# Patient Record
Sex: MALE | Race: WHITE | Employment: UNEMPLOYED | URBAN - METROPOLITAN AREA
[De-identification: names, ages, dates, MRNs, and addresses within clinical notes are randomized per-mention and may not be internally consistent; named-entity substitution may affect disease eponyms.]

---

## 2019-03-05 ENCOUNTER — HOSPITAL ENCOUNTER (OUTPATIENT)
Age: 54
Setting detail: SPECIMEN
Discharge: HOME | End: 2019-03-05
Payer: MEDICAID

## 2019-03-05 LAB
ABSOLUTE EOS #: 0.26 K/UL (ref 0–0.44)
ABSOLUTE IMMATURE GRANULOCYTE: 0.05 K/UL (ref 0–0.3)
ABSOLUTE LYMPH #: 2.06 K/UL (ref 1.1–3.7)
ABSOLUTE MONO #: 0.88 K/UL (ref 0.1–1.2)
ALBUMIN SERPL-MCNC: 4.1 G/DL (ref 3.5–5.2)
ALBUMIN/GLOBULIN RATIO: 1.6 (ref 1–2.5)
ALP BLD-CCNC: 111 U/L (ref 40–129)
ALT SERPL-CCNC: 11 U/L (ref 5–41)
ANION GAP SERPL CALCULATED.3IONS-SCNC: 16 MMOL/L (ref 9–17)
AST SERPL-CCNC: 13 U/L
BASOPHILS # BLD: 1 % (ref 0–2)
BASOPHILS ABSOLUTE: 0.16 K/UL (ref 0–0.2)
BILIRUB SERPL-MCNC: 0.53 MG/DL (ref 0.3–1.2)
BUN BLDV-MCNC: 11 MG/DL (ref 6–20)
BUN/CREAT BLD: ABNORMAL (ref 9–20)
CALCIUM SERPL-MCNC: 9.4 MG/DL (ref 8.6–10.4)
CHLORIDE BLD-SCNC: 100 MMOL/L (ref 98–107)
CO2: 19 MMOL/L (ref 20–31)
CREAT SERPL-MCNC: 0.65 MG/DL (ref 0.7–1.2)
DIFFERENTIAL TYPE: ABNORMAL
EOSINOPHILS RELATIVE PERCENT: 2 % (ref 1–4)
GFR AFRICAN AMERICAN: >60 ML/MIN
GFR NON-AFRICAN AMERICAN: >60 ML/MIN
GFR SERPL CREATININE-BSD FRML MDRD: ABNORMAL ML/MIN/{1.73_M2}
GFR SERPL CREATININE-BSD FRML MDRD: ABNORMAL ML/MIN/{1.73_M2}
GLUCOSE BLD-MCNC: 237 MG/DL (ref 70–99)
HCT VFR BLD CALC: 55.8 % (ref 40.7–50.3)
HEMOGLOBIN: 19.6 G/DL (ref 13–17)
IMMATURE GRANULOCYTES: 0 %
LYMPHOCYTES # BLD: 13 % (ref 24–43)
MAGNESIUM: 1.9 MG/DL (ref 1.6–2.6)
MCH RBC QN AUTO: 30.4 PG (ref 25.2–33.5)
MCHC RBC AUTO-ENTMCNC: 35.1 G/DL (ref 28.4–34.8)
MCV RBC AUTO: 86.5 FL (ref 82.6–102.9)
MONOCYTES # BLD: 6 % (ref 3–12)
NRBC AUTOMATED: 0 PER 100 WBC
PDW BLD-RTO: 15.3 % (ref 11.8–14.4)
PLATELET # BLD: 232 K/UL (ref 138–453)
PLATELET ESTIMATE: ABNORMAL
PMV BLD AUTO: 10.3 FL (ref 8.1–13.5)
POTASSIUM SERPL-SCNC: 5.5 MMOL/L (ref 3.7–5.3)
RBC # BLD: 6.45 M/UL (ref 4.21–5.77)
RBC # BLD: ABNORMAL 10*6/UL
SEG NEUTROPHILS: 78 % (ref 36–65)
SEGMENTED NEUTROPHILS ABSOLUTE COUNT: 12.3 K/UL (ref 1.5–8.1)
SODIUM BLD-SCNC: 135 MMOL/L (ref 135–144)
TOTAL PROTEIN: 6.6 G/DL (ref 6.4–8.3)
WBC # BLD: 15.7 K/UL (ref 3.5–11.3)
WBC # BLD: ABNORMAL 10*3/UL

## 2019-03-06 LAB
CULTURE: NORMAL
Lab: NORMAL
SPECIMEN DESCRIPTION: NORMAL

## 2019-08-30 ENCOUNTER — HOSPITAL ENCOUNTER (OUTPATIENT)
Age: 54
Setting detail: SPECIMEN
Discharge: HOME | End: 2019-08-30
Payer: MEDICAID

## 2019-08-30 LAB
ABSOLUTE EOS #: 0.4 K/UL (ref 0–0.44)
ABSOLUTE IMMATURE GRANULOCYTE: 0.05 K/UL (ref 0–0.3)
ABSOLUTE LYMPH #: 2.68 K/UL (ref 1.1–3.7)
ABSOLUTE MONO #: 0.75 K/UL (ref 0.1–1.2)
BASOPHILS # BLD: 1 % (ref 0–2)
BASOPHILS ABSOLUTE: 0.11 K/UL (ref 0–0.2)
DIFFERENTIAL TYPE: ABNORMAL
EOSINOPHILS RELATIVE PERCENT: 4 % (ref 1–4)
HCT VFR BLD CALC: 43.3 % (ref 40.7–50.3)
HEMOGLOBIN: 13.7 G/DL (ref 13–17)
IMMATURE GRANULOCYTES: 1 %
INR BLD: 2.2
LYMPHOCYTES # BLD: 25 % (ref 24–43)
MCH RBC QN AUTO: 29.5 PG (ref 25.2–33.5)
MCHC RBC AUTO-ENTMCNC: 31.6 G/DL (ref 28.4–34.8)
MCV RBC AUTO: 93.1 FL (ref 82.6–102.9)
MONOCYTES # BLD: 7 % (ref 3–12)
NRBC AUTOMATED: 0 PER 100 WBC
PDW BLD-RTO: 12.9 % (ref 11.8–14.4)
PLATELET # BLD: 255 K/UL (ref 138–453)
PLATELET ESTIMATE: ABNORMAL
PMV BLD AUTO: 10.7 FL (ref 8.1–13.5)
PROTHROMBIN TIME: 22.3 SEC (ref 9–12)
RBC # BLD: 4.65 M/UL (ref 4.21–5.77)
RBC # BLD: ABNORMAL 10*6/UL
SEG NEUTROPHILS: 62 % (ref 36–65)
SEGMENTED NEUTROPHILS ABSOLUTE COUNT: 6.81 K/UL (ref 1.5–8.1)
WBC # BLD: 10.8 K/UL (ref 3.5–11.3)
WBC # BLD: ABNORMAL 10*3/UL

## 2020-05-06 ENCOUNTER — HOSPITAL ENCOUNTER (OUTPATIENT)
Age: 55
Setting detail: SPECIMEN
Discharge: HOME | End: 2020-05-06
Payer: MEDICAID

## 2020-05-06 LAB
ALBUMIN SERPL-MCNC: 4.3 G/DL (ref 3.5–5.2)
ALBUMIN/GLOBULIN RATIO: 1.8 (ref 1–2.5)
ALP BLD-CCNC: 82 U/L (ref 40–129)
ALT SERPL-CCNC: 14 U/L (ref 5–41)
ANION GAP SERPL CALCULATED.3IONS-SCNC: 17 MMOL/L (ref 9–17)
AST SERPL-CCNC: 12 U/L
BILIRUB SERPL-MCNC: 0.32 MG/DL (ref 0.3–1.2)
BUN BLDV-MCNC: 16 MG/DL (ref 6–20)
BUN/CREAT BLD: ABNORMAL (ref 9–20)
CALCIUM SERPL-MCNC: 9.2 MG/DL (ref 8.6–10.4)
CHLORIDE BLD-SCNC: 101 MMOL/L (ref 98–107)
CO2: 19 MMOL/L (ref 20–31)
CREAT SERPL-MCNC: 0.93 MG/DL (ref 0.7–1.2)
GFR AFRICAN AMERICAN: >60 ML/MIN
GFR NON-AFRICAN AMERICAN: >60 ML/MIN
GFR SERPL CREATININE-BSD FRML MDRD: ABNORMAL ML/MIN/{1.73_M2}
GFR SERPL CREATININE-BSD FRML MDRD: ABNORMAL ML/MIN/{1.73_M2}
GLUCOSE BLD-MCNC: 170 MG/DL (ref 70–99)
HCT VFR BLD CALC: 49.9 % (ref 40.7–50.3)
HEMOGLOBIN: 16.4 G/DL (ref 13–17)
MCH RBC QN AUTO: 29.3 PG (ref 25.2–33.5)
MCHC RBC AUTO-ENTMCNC: 32.9 G/DL (ref 28.4–34.8)
MCV RBC AUTO: 89.1 FL (ref 82.6–102.9)
NRBC AUTOMATED: 0 PER 100 WBC
PDW BLD-RTO: 13.2 % (ref 11.8–14.4)
PLATELET # BLD: 242 K/UL (ref 138–453)
PMV BLD AUTO: 11.6 FL (ref 8.1–13.5)
POTASSIUM SERPL-SCNC: 4.6 MMOL/L (ref 3.7–5.3)
PROSTATE SPECIFIC ANTIGEN: 0.95 UG/L
RBC # BLD: 5.6 M/UL (ref 4.21–5.77)
SODIUM BLD-SCNC: 137 MMOL/L (ref 135–144)
TOTAL PROTEIN: 6.7 G/DL (ref 6.4–8.3)
TSH SERPL DL<=0.05 MIU/L-ACNC: 2.54 MIU/L (ref 0.3–5)
WBC # BLD: 11 K/UL (ref 3.5–11.3)

## 2020-08-10 NOTE — PROGRESS NOTES
Esther Willard. [facility], [address] [place] [address] [address] [place] [id number]  [phone]  New Patient Evaluation in Office    Pt Name: Taiwo Worley  Date of Birth 1965   Today's Date: 8/11/2020  Medical Record Number: [id number]  Referring Provider: Marina Vaca,*  Primary Care Provider: ANNETTE Field - JAMEL  Chief Complaint   Patient presents with   [facility] Consult     new patient- ref KATHY villanueva- mass on neck- doppler done 7/17     ASSESSMENT       Diagnosis Orders   1. Skin lesion of neck  Covid-19 Ambulatory   2. Pre-op testing  Covid-19 Ambulatory     Past Medical History:   Diagnosis Date    Anxiety     Diabetes mellitus ([facility]     Major depression     Peripheral nerve disorder     Peripheral vascular disease, unspecified ([facility]           PLANS      1. Schedule Thomas for excision of right neck skin lesion  2. The risks complications and benefits of the procedure were discussed with the patient including, but not limited to, infection, bleeding, recurrence, injury to adjacent tissues or organs and open wounds. The patient was given an opportunity to ask questions. Once answered, the patient is agreeable to proceed with the procedure. 2. Status: outpatient  3. Planned anesthesia: MAC  4. He will undergo pre-operative clearance per anesthesia guidelines with risk factors listed under the past medical history diagnosis & problem list.  5. Perioperative discontinuation of warfarin and Effient. Continuation of 81 mg Aspirin is acceptable. Lynette Ku is a 62 y.o. male seen in the consultation for evaluation of a subcutaneous mass. The mass was first noted a few months ago. The mass is located right neck, is not tender. It has been enlarging and bleeding since it was first noticed. He denies fatigue, night sweats, weight loss, recent or current infections, immunosuppression and personal history of malignancy.   Past Medical History  Past Medical History:   Diagnosis Date    Anxiety     Diabetes mellitus ([facility]     Major depression     Peripheral nerve disorder     Peripheral vascular disease, unspecified ([facility] [facility]      Past Surgical History  Past Surgical History:   Procedure Laterality Date    CARPAL TUNNEL RELEASE      90s    CORONARY ANGIOPLASTY WITH STENT PLACEMENT  2018    Dr. Erica Garces  2005     Medications  Current Outpatient Medications   Medication Sig Dispense Refill    ASPIRIN LOW DOSE 81 MG EC tablet       atorvastatin (LIPITOR) 40 MG tablet       Emollient (EUCERIN) lotion Eucerin External Lotion Eucerin External Lotion 05/06/2020 Provider: Alex Rabago [facility] 05- [facility] 87 ([id number])      gabapentin (NEURONTIN) 600 MG tablet       JANUVIA 100 MG tablet       losartan (COZAAR) 50 MG tablet       MELATONIN MAXIMUM STRENGTH 5 MG TABS tablet       metFORMIN (GLUCOPHAGE-XR) 500 MG extended release tablet       metoprolol tartrate (LOPRESSOR) 25 MG tablet       prasugrel (EFFIENT) 10 MG TABS       STEGLATRO 5 MG TABS       warfarin (COUMADIN) 5 MG tablet take 1 tablet by mouth once daily as directed       No current facility-administered medications for this visit. Allergies  has No Known Allergies. Family History  family history includes Diabetes in his father, maternal grandmother, mother, and paternal grandmother. Social History   reports that he has quit smoking. He has never used smokeless tobacco. He reports previous alcohol use. He reports that he does not use drugs.   Health Screening Exams  Health Maintenance   Topic Date Due    Hepatitis C screen  1965    Lipid screen  09/08/1973    HIV screen  09/08/1978    DTaP/Tdap/Td vaccine (1 - Tdap) 09/08/1982    Diabetes screen  09/08/2003    Shingles Vaccine (1 of 2) 09/08/2013    Colon cancer screen colonoscopy  09/08/2013    Flu vaccine (1) 09/01/2020    Potassium monitoring  05/06/2021    Creatinine monitoring  05/06/2021    Hepatitis A vaccine  Aged Out    Hepatitis B vaccine  Aged Out    Hib vaccine  Aged Out    Meningococcal (ACWY) vaccine  Aged Out    Pneumococcal 0-64 years Vaccine  Aged Out     Review of Systems  Constitutional: Negative for activity change, appetite change, chills, diaphoresis, fatigue, fever and unexpected weight change. HENT: Negative for congestion, dental problem, drooling, ear discharge, ear pain, facial swelling, hearing loss, mouth sores, nosebleeds, postnasal drip, rhinorrhea, sinus pressure, sneezing, sore throat, tinnitus, trouble swallowing and voice change. Eyes: Negative for photophobia, pain, discharge, redness, itching and visual disturbance. Respiratory: Negative for apnea, cough, choking, chest tightness, shortness of breath, wheezing and stridor. Cardiovascular: Negative for chest pain, palpitations and leg swelling. Gastrointestinal: Negative for abdominal distention, abdominal pain, anal bleeding, blood in stool, constipation, diarrhea, nausea, rectal pain and vomiting. Genitourinary: Negative for decreased urine volume, difficulty urinating, discharge, dysuria, enuresis, flank pain, frequency, genital sores, hematuria, penile pain, penile swelling, scrotal swelling, testicular pain and urgency. Musculoskeletal: Negative for arthralgias, back pain, gait problem, joint swelling, myalgias, neck pain and neck stiffness. Skin: Negative for color change, pallor, rash and wound. Neurological: Negative for dizziness, tremors, seizures, syncope, facial asymmetry, speech difficulty, weakness, light-headedness, numbness and headaches. Hematological: Negative for adenopathy. Does not bruise/bleed easily. Psychiatric/Behavioral: Negative for agitation, behavioral problems, confusion, decreased concentration, dysphoric mood, hallucinations, self-injury, sleep disturbance and suicidal ideas.  The patient is not nervous/anxious and is not hyperactive. OBJECTIVE    VITALS:  height is 5' 9\" (1.753 m) and weight is 218 lb (98.9 kg). His temporal temperature is 98.2 °F (36.8 °C). His blood pressure is 132/84 and his pulse is 72. His respiration is 18 and oxygen saturation is 97%. Body mass index is 32.19 kg/m². CONSTITUTIONAL: Alert and oriented times 3, no acute distress and cooperative to examination with proper mood and affect. SKIN: Skin color, texture, turgor normal. 1.2 cm dermal lesion with superficial excoriation present on the right neck, possible basal cell. HEENT: Head is normocephalic, atraumatic. EOMI, PERRLA. NECK: Supple, symmetrical, trachea midline, thyroid symmetric, not enlarged and no tenderness, skin normal.  CHEST/LUNGS: chest symmetric with normal A/P diameter, normal respiratory rate and rhythm, lungs clear to auscultation without wheezes, rales or rhonchi. No accessory muscle use. Scars None   CARDIOVASCULAR: Heart sounds are normal.  Regular rate and rhythm without murmur, gallop or rub. Normal S1 and S2. Carotid and femoral pulses 2+/4 and equal bilaterally. ABDOMEN: obese No scar(s) present. Normal bowel sounds. No bruits. soft, nontender, nondistended, no masses or organomegaly. no evidence of hernia. Percussion: Normal without hepatosplenomegally. RECTAL: deferred, not clinically indicated  NEUROLOGIC: There are no focalizing motor or sensory deficits. CN II-XII are grossly intact. Othelia Ricardo EXTREMITIES: no cyanosis, no clubbing and no edema. LYMPH: No cervical or inguinal lymphadenopathy. Thank you for the interesting evaluation. Further recommendations as listed above.        Electronically signed by Balwinder Hercules DO on 8/11/2020 at 12:09 PM

## 2020-08-11 ENCOUNTER — OFFICE VISIT (OUTPATIENT)
Dept: SURGERY | Age: 55
End: 2020-08-11
Payer: MEDICAID

## 2020-08-11 ENCOUNTER — TELEPHONE (OUTPATIENT)
Dept: SURGERY | Age: 55
End: 2020-08-11

## 2020-08-11 VITALS
HEIGHT: 69 IN | BODY MASS INDEX: 32.29 KG/M2 | DIASTOLIC BLOOD PRESSURE: 84 MMHG | TEMPERATURE: 98.2 F | WEIGHT: 218 LBS | SYSTOLIC BLOOD PRESSURE: 132 MMHG | RESPIRATION RATE: 18 BRPM | OXYGEN SATURATION: 97 % | HEART RATE: 72 BPM

## 2020-08-11 PROCEDURE — G8427 DOCREV CUR MEDS BY ELIG CLIN: HCPCS | Performed by: SURGERY

## 2020-08-11 PROCEDURE — 99203 OFFICE O/P NEW LOW 30 MIN: CPT | Performed by: SURGERY

## 2020-08-11 PROCEDURE — G8417 CALC BMI ABV UP PARAM F/U: HCPCS | Performed by: SURGERY

## 2020-08-11 RX ORDER — ATORVASTATIN CALCIUM 40 MG/1
TABLET, FILM COATED ORAL
COMMUNITY
Start: 2020-07-29

## 2020-08-11 RX ORDER — WARFARIN SODIUM 5 MG/1
5 TABLET ORAL DAILY
COMMUNITY
Start: 2020-07-22

## 2020-08-11 RX ORDER — LOSARTAN POTASSIUM 50 MG/1
TABLET ORAL
COMMUNITY
Start: 2020-05-20

## 2020-08-11 RX ORDER — PRASUGREL 10 MG/1
TABLET, FILM COATED ORAL
Status: ON HOLD | COMMUNITY
Start: 2020-05-05 | End: 2020-08-20 | Stop reason: ALTCHOICE

## 2020-08-11 RX ORDER — ERTUGLIFLOZIN 5 MG/1
TABLET, FILM COATED ORAL
COMMUNITY
Start: 2020-07-21

## 2020-08-11 RX ORDER — LANOLIN ALCOHOL/MO/W.PET/CERES
CREAM (GRAM) TOPICAL
COMMUNITY
Start: 2020-05-06

## 2020-08-11 RX ORDER — GABAPENTIN 600 MG/1
TABLET ORAL
COMMUNITY
Start: 2020-07-24

## 2020-08-11 RX ORDER — METFORMIN HYDROCHLORIDE 500 MG/1
TABLET, EXTENDED RELEASE ORAL
COMMUNITY
Start: 2020-07-28

## 2020-08-11 RX ORDER — ASPIRIN 81 MG/1
TABLET, COATED ORAL
COMMUNITY
Start: 2020-07-21 | End: 2022-05-17

## 2020-08-11 RX ORDER — MELATONIN 5 MG
TABLET ORAL
Status: ON HOLD | COMMUNITY
Start: 2020-07-28 | End: 2020-08-20 | Stop reason: ALTCHOICE

## 2020-08-11 RX ORDER — SITAGLIPTIN 100 MG/1
TABLET, FILM COATED ORAL
COMMUNITY
Start: 2020-07-01

## 2020-08-11 ASSESSMENT — ENCOUNTER SYMPTOMS
APNEA: 0
VOMITING: 0
COLOR CHANGE: 0
SINUS PRESSURE: 0
SHORTNESS OF BREATH: 0
ANAL BLEEDING: 0
CONSTIPATION: 0
COUGH: 0
BLOOD IN STOOL: 0
EYE DISCHARGE: 0
CHEST TIGHTNESS: 0
STRIDOR: 0
FACIAL SWELLING: 0
CHOKING: 0
ABDOMINAL PAIN: 0
RHINORRHEA: 0
NAUSEA: 0
EYE ITCHING: 0
EYE PAIN: 0
WHEEZING: 0
SORE THROAT: 0
PHOTOPHOBIA: 0
TROUBLE SWALLOWING: 0
RECTAL PAIN: 0
VOICE CHANGE: 0
DIARRHEA: 0
EYE REDNESS: 0
BACK PAIN: 0
ABDOMINAL DISTENTION: 0

## 2020-08-11 NOTE — TELEPHONE ENCOUNTER
Spoke w/ pt, informed him he would need COVID testing 5-7 days prior to his surgery on 8/20.   Pt voiced understanding

## 2020-08-11 NOTE — PROGRESS NOTES
Subjective:      Patient ID: Asif Shipman is a 62 y.o. male. Chief Complaint   Patient presents with    Surgical Consult     new patient- ref KATHY villanueva- mass on neck- doppler done 7/17       HPI    Review of Systems   Constitutional: Negative for activity change, appetite change, chills, diaphoresis, fatigue, fever and unexpected weight change. HENT: Negative for congestion, dental problem, drooling, ear discharge, ear pain, facial swelling, hearing loss, mouth sores, nosebleeds, postnasal drip, rhinorrhea, sinus pressure, sneezing, sore throat, tinnitus, trouble swallowing and voice change. Eyes: Negative for photophobia, pain, discharge, redness, itching and visual disturbance. Respiratory: Negative for apnea, cough, choking, chest tightness, shortness of breath, wheezing and stridor. Cardiovascular: Negative for chest pain, palpitations and leg swelling. Gastrointestinal: Negative for abdominal distention, abdominal pain, anal bleeding, blood in stool, constipation, diarrhea, nausea, rectal pain and vomiting. Genitourinary: Negative for decreased urine volume, difficulty urinating, discharge, dysuria, enuresis, flank pain, frequency, genital sores, hematuria, penile pain, penile swelling, scrotal swelling, testicular pain and urgency. Musculoskeletal: Negative for arthralgias, back pain, gait problem, joint swelling, myalgias, neck pain and neck stiffness. Skin: Negative for color change, pallor, rash and wound. Neurological: Negative for dizziness, tremors, seizures, syncope, facial asymmetry, speech difficulty, weakness, light-headedness, numbness and headaches. Hematological: Negative for adenopathy. Does not bruise/bleed easily. Psychiatric/Behavioral: Negative for agitation, behavioral problems, confusion, decreased concentration, dysphoric mood, hallucinations, self-injury, sleep disturbance and suicidal ideas. The patient is not nervous/anxious and is not hyperactive. /84 (Site: Right Upper Arm, Position: Sitting, Cuff Size: Medium Adult)   Pulse 72   Temp 98.2 °F (36.8 °C) (Temporal)   Resp 18   Ht 5' 9\" (1.753 m)   Wt 218 lb (98.9 kg)   SpO2 97%   BMI 32.19 kg/m²     Objective:   Physical Exam    Assessment:          Plan:              [employer], LPN

## 2020-08-14 ENCOUNTER — HOSPITAL ENCOUNTER (OUTPATIENT)
Age: 55
Discharge: HOME | End: 2020-08-14
Payer: MEDICAID

## 2020-08-14 PROCEDURE — U0003 INFECTIOUS AGENT DETECTION BY NUCLEIC ACID (DNA OR RNA); SEVERE ACUTE RESPIRATORY SYNDROME CORONAVIRUS 2 (SARS-COV-2) (CORONAVIRUS DISEASE [COVID-19]), AMPLIFIED PROBE TECHNIQUE, MAKING USE OF HIGH THROUGHPUT TECHNOLOGIES AS DESCRIBED BY CMS-2020-01-R: HCPCS

## 2020-08-16 LAB — SARS-COV-2: NOT DETECTED

## 2020-08-19 NOTE — PROGRESS NOTES
Message left for the patient to call \[facility]\"" regarding COVID questions.

## 2020-08-20 ENCOUNTER — HOSPITAL ENCOUNTER (OUTPATIENT)
Age: 55
Setting detail: OUTPATIENT SURGERY
Discharge: HOME | End: 2020-08-20
Attending: SURGERY | Admitting: SURGERY
Payer: MEDICAID

## 2020-08-20 ENCOUNTER — ANESTHESIA EVENT (OUTPATIENT)
Dept: OPERATING ROOM | Age: 55
End: 2020-08-20
Payer: MEDICAID

## 2020-08-20 ENCOUNTER — ANESTHESIA (OUTPATIENT)
Dept: OPERATING ROOM | Age: 55
End: 2020-08-20
Payer: MEDICAID

## 2020-08-20 VITALS
SYSTOLIC BLOOD PRESSURE: 163 MMHG | HEART RATE: 49 BPM | OXYGEN SATURATION: 100 % | BODY MASS INDEX: 32.08 KG/M2 | TEMPERATURE: 97.4 F | WEIGHT: 216.6 LBS | HEIGHT: 69 IN | DIASTOLIC BLOOD PRESSURE: 67 MMHG | RESPIRATION RATE: 16 BRPM

## 2020-08-20 VITALS — OXYGEN SATURATION: 99 % | DIASTOLIC BLOOD PRESSURE: 44 MMHG | SYSTOLIC BLOOD PRESSURE: 96 MMHG

## 2020-08-20 LAB — GLUCOSE BLD-MCNC: 186 MG/DL (ref 70–108)

## 2020-08-20 PROCEDURE — 2580000003 HC RX 258: Performed by: SURGERY

## 2020-08-20 PROCEDURE — 6360000002 HC RX W HCPCS: Performed by: NURSE ANESTHETIST, CERTIFIED REGISTERED

## 2020-08-20 PROCEDURE — 88305 TISSUE EXAM BY PATHOLOGIST: CPT

## 2020-08-20 PROCEDURE — 3700000001 HC ADD 15 MINUTES (ANESTHESIA): Performed by: SURGERY

## 2020-08-20 PROCEDURE — 7100000010 HC PHASE II RECOVERY - FIRST 15 MIN: Performed by: SURGERY

## 2020-08-20 PROCEDURE — 2709999900 HC NON-CHARGEABLE SUPPLY: Performed by: SURGERY

## 2020-08-20 PROCEDURE — 11622 EXC S/N/H/F/G MAL+MRG 1.1-2: CPT | Performed by: SURGERY

## 2020-08-20 PROCEDURE — 6360000002 HC RX W HCPCS: Performed by: SURGERY

## 2020-08-20 PROCEDURE — 3700000000 HC ANESTHESIA ATTENDED CARE: Performed by: SURGERY

## 2020-08-20 PROCEDURE — 2500000003 HC RX 250 WO HCPCS: Performed by: SURGERY

## 2020-08-20 PROCEDURE — 7100000011 HC PHASE II RECOVERY - ADDTL 15 MIN: Performed by: SURGERY

## 2020-08-20 PROCEDURE — 3600000002 HC SURGERY LEVEL 2 BASE: Performed by: SURGERY

## 2020-08-20 PROCEDURE — 82948 REAGENT STRIP/BLOOD GLUCOSE: CPT

## 2020-08-20 PROCEDURE — 3600000012 HC SURGERY LEVEL 2 ADDTL 15MIN: Performed by: SURGERY

## 2020-08-20 RX ORDER — LIDOCAINE HYDROCHLORIDE 10 MG/ML
INJECTION, SOLUTION EPIDURAL; INFILTRATION; INTRACAUDAL; PERINEURAL PRN
Status: DISCONTINUED | OUTPATIENT
Start: 2020-08-20 | End: 2020-08-20 | Stop reason: ALTCHOICE

## 2020-08-20 RX ORDER — HYDROCODONE BITARTRATE AND ACETAMINOPHEN 5; 325 MG/1; MG/1
1 TABLET ORAL EVERY 4 HOURS PRN
Status: DISCONTINUED | OUTPATIENT
Start: 2020-08-20 | End: 2020-08-20 | Stop reason: HOSPADM

## 2020-08-20 RX ORDER — SODIUM CHLORIDE 9 MG/ML
INJECTION, SOLUTION INTRAVENOUS CONTINUOUS
Status: DISCONTINUED | OUTPATIENT
Start: 2020-08-20 | End: 2020-08-20 | Stop reason: HOSPADM

## 2020-08-20 RX ORDER — ONDANSETRON 2 MG/ML
4 INJECTION INTRAMUSCULAR; INTRAVENOUS EVERY 6 HOURS PRN
Status: DISCONTINUED | OUTPATIENT
Start: 2020-08-20 | End: 2020-08-20 | Stop reason: HOSPADM

## 2020-08-20 RX ORDER — SODIUM CHLORIDE 0.9 % (FLUSH) 0.9 %
10 SYRINGE (ML) INJECTION PRN
Status: DISCONTINUED | OUTPATIENT
Start: 2020-08-20 | End: 2020-08-20 | Stop reason: HOSPADM

## 2020-08-20 RX ORDER — MIDAZOLAM HYDROCHLORIDE 1 MG/ML
INJECTION INTRAMUSCULAR; INTRAVENOUS PRN
Status: DISCONTINUED | OUTPATIENT
Start: 2020-08-20 | End: 2020-08-20 | Stop reason: SDUPTHER

## 2020-08-20 RX ORDER — SODIUM CHLORIDE 0.9 % (FLUSH) 0.9 %
10 SYRINGE (ML) INJECTION EVERY 12 HOURS SCHEDULED
Status: DISCONTINUED | OUTPATIENT
Start: 2020-08-20 | End: 2020-08-20 | Stop reason: HOSPADM

## 2020-08-20 RX ORDER — PROPOFOL 10 MG/ML
INJECTION, EMULSION INTRAVENOUS CONTINUOUS PRN
Status: DISCONTINUED | OUTPATIENT
Start: 2020-08-20 | End: 2020-08-20 | Stop reason: SDUPTHER

## 2020-08-20 RX ORDER — HYDROCODONE BITARTRATE AND ACETAMINOPHEN 5; 325 MG/1; MG/1
1 TABLET ORAL EVERY 6 HOURS PRN
Qty: 20 TABLET | Refills: 0 | Status: SHIPPED | OUTPATIENT
Start: 2020-08-20 | End: 2020-08-25

## 2020-08-20 RX ORDER — FENTANYL CITRATE 50 UG/ML
INJECTION, SOLUTION INTRAMUSCULAR; INTRAVENOUS PRN
Status: DISCONTINUED | OUTPATIENT
Start: 2020-08-20 | End: 2020-08-20 | Stop reason: SDUPTHER

## 2020-08-20 RX ADMIN — SODIUM CHLORIDE: 9 INJECTION, SOLUTION INTRAVENOUS at 09:05

## 2020-08-20 RX ADMIN — PROPOFOL 100 MCG/KG/MIN: 10 INJECTION, EMULSION INTRAVENOUS at 10:22

## 2020-08-20 RX ADMIN — FENTANYL CITRATE 50 MCG: 50 INJECTION, SOLUTION INTRAMUSCULAR; INTRAVENOUS at 10:24

## 2020-08-20 RX ADMIN — CEFAZOLIN 2 G: 10 INJECTION, POWDER, FOR SOLUTION INTRAVENOUS at 10:29

## 2020-08-20 RX ADMIN — MIDAZOLAM HYDROCHLORIDE 2 MG: 1 INJECTION, SOLUTION INTRAMUSCULAR; INTRAVENOUS at 10:24

## 2020-08-20 ASSESSMENT — PULMONARY FUNCTION TESTS
PIF_VALUE: 0
PIF_VALUE: 1
PIF_VALUE: 0
PIF_VALUE: 1
PIF_VALUE: 0
PIF_VALUE: 0
PIF_VALUE: 1
PIF_VALUE: 0
PIF_VALUE: 1
PIF_VALUE: 0
PIF_VALUE: 1
PIF_VALUE: 1
PIF_VALUE: 0
PIF_VALUE: 1

## 2020-08-20 ASSESSMENT — PAIN SCALES - GENERAL
PAINLEVEL_OUTOF10: 0

## 2020-08-20 ASSESSMENT — PAIN DESCRIPTION - PAIN TYPE: TYPE: SURGICAL PAIN

## 2020-08-20 ASSESSMENT — PAIN DESCRIPTION - LOCATION: LOCATION: NECK

## 2020-08-20 NOTE — BRIEF OP NOTE
Brief Postoperative Note      Patient: Dione Yanez  YOB: 1965  MRN: [id number]    Date of Procedure: 8/20/2020    Pre-Op Diagnosis: RIGHT NECK SKIN LESION    Post-Op Diagnosis: Same       Procedure(s):  EXCISION RIGHT NECK SKIN LESION 2x 1 cm down to fat    Surgeon(s):  Marily Cuello DO    Assistant:  First Assistant: Cherylene Netter, RN    Anesthesia: Monitor Anesthesia Care    Estimated Blood Loss (mL): 5    Complications: None    Specimens:   ID Type Source Tests Collected by Time Destination   A : RIGHT NECK SKIN LESION Tissue Neck SURGICAL [address] 41DO 8/20/2020 2521      Specimen tagged anterior with long white suture and lateral with short white suture.     Electronically signed by Marily Cuello DO on 8/20/2020 at 10:49 AM

## 2020-08-20 NOTE — ANESTHESIA PRE PROCEDURE
Allergies:  No Known Allergies    Problem List:  There is no problem list on file for this patient. Past Medical History:        Diagnosis Date    Anxiety     Diabetes mellitus ([facility]     Major depression     Peripheral nerve disorder     [facility]        Past Surgical History:        Procedure Laterality Date    CARPAL TUNNEL RELEASE      90s    CORONARY ANGIOPLASTY WITH STENT PLACEMENT  2018    Dr. Mary Cassidy  2005       Social History:    Social History     Tobacco Use    Smoking status: Former Smoker    Smokeless tobacco: Never Used   Substance Use Topics    Alcohol use: Not Currently                                Counseling given: Not Answered      Vital Signs (Current):   Vitals:    08/20/20 0824   BP: (!) 151/68   Pulse: 58   Resp: 16   Temp: 96.8 °F (36 °C)   TempSrc: Temporal   SpO2: 98%   Weight: 216 lb 9.6 oz (98.2 kg)   Height: 5' 9\" (1.753 m)                                              BP Readings from Last 3 Encounters:   08/20/20 (!) 151/68   08/11/20 132/84       NPO Status:                                                                                 BMI:   Wt Readings from Last 3 Encounters:   08/20/20 216 lb 9.6 oz (98.2 kg)   08/11/20 218 lb (98.9 kg)     Body mass index is 31.99 kg/m².     CBC:   Lab Results   Component Value Date    WBC 11.0 05/06/2020    RBC 5.60 05/06/2020    HGB 16.4 05/06/2020    HCT 49.9 05/06/2020    MCV 89.1 05/06/2020    RDW 13.2 05/06/2020     05/06/2020       CMP:   Lab Results   Component Value Date     05/06/2020    K 4.6 05/06/2020     05/06/2020    CO2 19 05/06/2020    BUN 16 05/06/2020    CREATININE 0.93 05/06/2020    GFRAA >60 05/06/2020    LABGLOM >60 05/06/2020    GLUCOSE 170 05/06/2020    PROT 6.7 05/06/2020    CALCIUM 9.2 05/06/2020    BILITOT 0.32 05/06/2020    ALKPHOS 82 05/06/2020    AST 12 05/06/2020    ALT 14 05/06/2020       POC Tests: No results for input(s): POCGLU, POCNA, POCK, POCCL, POCBUN, POCHEMO, POCHCT in the last 72 hours. Coags:   Lab Results   Component Value Date    PROTIME 22.3 08/30/2019    INR 2.2 08/30/2019       HCG (If Applicable): No results found for: PREGTESTUR, PREGSERUM, HCG, HCGQUANT     ABGs: No results found for: PHART, PO2ART, EDI9YAG, IAE1IDZ, BEART, X7VNELAM     Type & Screen (If Applicable):  No results found for: LABABO, LABRH    Drug/Infectious Status (If Applicable):  No results found for: HIV, HEPCAB    COVID-19 Screening (If Applicable):   Lab Results   Component Value Date    COVID19 Not Detected 08/14/2020         Anesthesia Evaluation    Airway: Mallampati: II  TM distance: >3 FB   Neck ROM: full  Mouth opening: > = 3 FB Dental:          Pulmonary:   (+) decreased breath sounds,                             Cardiovascular:            Rhythm: regular                      Neuro/Psych:   (+) psychiatric history:            GI/Hepatic/Renal:             Endo/Other:    (+) Diabetes, . Abdominal:   (+) obese,         Vascular:                                        Anesthesia Plan      MAC     ASA 3     (Pt.'s sister along with pt.)  Induction: intravenous. MIPS: Postoperative opioids intended and Prophylactic antiemetics administered. Anesthetic plan and risks discussed with patient and sibling. Plan discussed with CRNA.                   Kylie Geronimo MD   8/20/2020

## 2020-08-20 NOTE — H&P
Vane Mcconnell. [facility], [address] [place] [id number]  [phone]  New Patient Evaluation in Office    Pt Name: Asif Shipman  Date of Birth 1965   Today's Date: 8/11/2020  Medical Record Number: [id number]  Referring Provider: Júnior Gunderson,*  Primary Care Provider: Igor Oliva, APRN - CNP  Chief Complaint   Patient presents with   [facility] Surgical Consult     new patient- ref KATHY villanueva- mass on neck- doppler done 7/17     ASSESSMENT       Diagnosis Orders   1. Skin lesion of neck  Covid-19 Ambulatory   2. Pre-op testing  Covid-19 Ambulatory     Past Medical History:   Diagnosis Date    Anxiety     Diabetes mellitus ([facility]     Major depression     Peripheral nerve disorder     Peripheral vascular disease, unspecified ([facility]           PLANS      1. Schedule Thomas for excision of right neck skin lesion  2. The risks complications and benefits of the procedure were discussed with the patient including, but not limited to, infection, bleeding, recurrence, injury to adjacent tissues or organs and open wounds. The patient was given an opportunity to ask questions. Once answered, the patient is agreeable to proceed with the procedure. 2. Status: outpatient  3. Planned anesthesia: MAC  4. He will undergo pre-operative clearance per anesthesia guidelines with risk factors listed under the past medical history diagnosis & problem list.  5. Perioperative discontinuation of warfarin and Effient. Continuation of 81 mg Aspirin is acceptable. Bhavana Amaral is a 62 y.o. male seen in the consultation for evaluation of a subcutaneous mass. The mass was first noted a few months ago. The mass is located right neck, is not tender. It has been enlarging and bleeding since it was first noticed. He denies fatigue, night sweats, weight loss, recent or current infections, immunosuppression and personal history of malignancy.   Past Medical Creatinine monitoring  05/06/2021    Hepatitis A vaccine  Aged Out    Hepatitis B vaccine  Aged Out    Hib vaccine  Aged Out    Meningococcal (ACWY) vaccine  Aged Out    Pneumococcal 0-64 years Vaccine  Aged Out     Review of Systems  Constitutional: Negative for activity change, appetite change, chills, diaphoresis, fatigue, fever and unexpected weight change. HENT: Negative for congestion, dental problem, drooling, ear discharge, ear pain, facial swelling, hearing loss, mouth sores, nosebleeds, postnasal drip, rhinorrhea, sinus pressure, sneezing, sore throat, tinnitus, trouble swallowing and voice change. Eyes: Negative for photophobia, pain, discharge, redness, itching and visual disturbance. Respiratory: Negative for apnea, cough, choking, chest tightness, shortness of breath, wheezing and stridor. Cardiovascular: Negative for chest pain, palpitations and leg swelling. Gastrointestinal: Negative for abdominal distention, abdominal pain, anal bleeding, blood in stool, constipation, diarrhea, nausea, rectal pain and vomiting. Genitourinary: Negative for decreased urine volume, difficulty urinating, discharge, dysuria, enuresis, flank pain, frequency, genital sores, hematuria, penile pain, penile swelling, scrotal swelling, testicular pain and urgency. Musculoskeletal: Negative for arthralgias, back pain, gait problem, joint swelling, myalgias, neck pain and neck stiffness. Skin: Negative for color change, pallor, rash and wound. Neurological: Negative for dizziness, tremors, seizures, syncope, facial asymmetry, speech difficulty, weakness, light-headedness, numbness and headaches. Hematological: Negative for adenopathy. Does not bruise/bleed easily. Psychiatric/Behavioral: Negative for agitation, behavioral problems, confusion, decreased concentration, dysphoric mood, hallucinations, self-injury, sleep disturbance and suicidal ideas.  The patient is not nervous/anxious and is not hyperactive. OBJECTIVE    VITALS:  height is 5' 9\" (1.753 m) and weight is 218 lb (98.9 kg). His temporal temperature is 98.2 °F (36.8 °C). His blood pressure is 132/84 and his pulse is 72. His respiration is 18 and oxygen saturation is 97%. Body mass index is 32.19 kg/m². CONSTITUTIONAL: Alert and oriented times 3, no acute distress and cooperative to examination with proper mood and affect. SKIN: Skin color, texture, turgor normal. 1.2 cm dermal lesion with superficial excoriation present on the right neck, possible basal cell. HEENT: Head is normocephalic, atraumatic. EOMI, PERRLA. NECK: Supple, symmetrical, trachea midline, thyroid symmetric, not enlarged and no tenderness, skin normal.  CHEST/LUNGS: chest symmetric with normal A/P diameter, normal respiratory rate and rhythm, lungs clear to auscultation without wheezes, rales or rhonchi. No accessory muscle use. Scars None   CARDIOVASCULAR: Heart sounds are normal.  Regular rate and rhythm without murmur, gallop or rub. Normal S1 and S2. Carotid and femoral pulses 2+/4 and equal bilaterally. ABDOMEN: obese No scar(s) present. Normal bowel sounds. No bruits. soft, nontender, nondistended, no masses or organomegaly. no evidence of hernia. Percussion: Normal without hepatosplenomegally. RECTAL: deferred, not clinically indicated  NEUROLOGIC: There are no focalizing motor or sensory deficits. CN II-XII are grossly intact. Juan Ward EXTREMITIES: no cyanosis, no clubbing and no edema. LYMPH: No cervical or inguinal lymphadenopathy. Thank you for the interesting evaluation. Further recommendations as listed above.        Electronically signed by Cory Gonzalez DO on 8/11/2020 at 12:09 PM      DO RO [address] DR GENERAL SURGERY  History and Physical Update    Pt Name: Rusty Cyr  MRN: [id number]  YOB: 1965  Date of evaluation: 8/20/2020    I have examined the patient and reviewed the H&P/Consult and there are no changes to the patient or plans.          Electronically signed by Lindsey Conroy DO on 8/20/2020 at 6:53 AM

## 2020-08-20 NOTE — PROGRESS NOTES
Alert. Family at bedside. Apple sauce and pepsi given. Side rails up bed in low position.  Call light in reach

## 2020-08-21 ENCOUNTER — TELEPHONE (OUTPATIENT)
Dept: SURGERY | Age: 55
End: 2020-08-21

## 2020-08-21 NOTE — TELEPHONE ENCOUNTER
8/20/2020 excision of right neck skin lesion    Pt states he is resting this morning, he is feeling okay. Going to the bathroom okay, appetite okay, minimal pain controlled with pain medication. Pt advised to use stool softeners and/or laxatives as needed and to increase water intake if able. Pt states home health nurse will be making a house call later this afternoon and will help set his medications up and check the incision. Advised to call the office with any questions or concerns.

## 2020-08-22 NOTE — OP NOTE
[address] [place] [address]                                OPERATIVE REPORT    PATIENT NAME: Robel Parada                   :        1965  MED REC NO:   [id number]                           ROOM:  ACCOUNT NO:   [de-identified]                           ADMIT DATE: 2020  PROVIDER:     Maritza Kimball D.O.    DATE OF PROCEDURE:  2020    PREOPERATIVE DIAGNOSIS:  Right-sided neck skin lesion. POSTOPERATIVE DIAGNOSIS:  Right-sided neck skin lesion. PROCEDURE PERFORMED:  Excision of right-sided neck lesion measuring 2 cm  in length and 1 cm in width down to the fatty tissue. SURGEON:  Maritza Kimball DO    ANESTHESIA:  Local with sedation. ESTIMATED BLOOD LOSS:  5 mL. SPECIMEN:  Skin lesion sent to Pathology for analysis, marked with a  long suture anteriorly and a short suture laterally. COMPLICATIONS:  None immediately appreciated. DISCUSSION:  The patient is a 39-year-old male who presented to my  office and seen in evaluation regarding lesion located in his right  posterior neck. After history and physical examination was performed,  potential diagnostic and therapeutic modalities were discussed with the  patient. Operative and nonoperative management were discussed. Risks,  complications, and benefit were reviewed. He was given the opportunity  to ask questions, once answered and informed consent was obtained, the  patient was brought to the operating room on 2020 for procedure. DESCRIPTION OF PROCEDURE:  The patient was brought to the operating  room, placed in the left lateral decubitus position; placed under  continuous cardiac telemetry, blood pressure and pulse oximetry  monitoring; and placed under IV sedation by the anesthesia department. The area was then prepped and draped in sterile fashion and infiltrated  with local anesthetic.   The lesion was elliptically incised using a

## 2020-09-03 NOTE — PROGRESS NOTES
Coreen Manning. [facility], [address] [place] [address]. [address]  [place] [address]  [phone]  Post Procedure Evaluation in Office    Patient seen and examined independently by me. The above note has been modified by me to reflect current findings and plans. Electronically signed by Brandon Jacques DO on 9/4/2020 at 10:31 AM      Pt Name: Kathrine Dalton  Date of Birth 1965   Today's Date: 9/4/2020  Medical Record Number: [id number]  Referring Provider: No ref. provider found  Primary Care Provider: Lynn Cervantes, APRN - CNP  Chief Complaint   Patient presents with   [facility] Post-Op Check     s/p Excision of right-sided neck lesion measuring 2 cm in length and 1 cm in width down to the fatty tissue-8/20/2020     ASSESSMENT       Diagnosis Orders   1. Basal cell carcinoma (BCC) of right side of neck      S/p completion excision 8/20/20     Incision is clean, dry and intact or healing as expected   PLANS      Pathology reviewed with the patient who understands. All questions were answered. May return to full activity without restrictions. Follow up: as needed      Ace Llamas is seen today for post-op follow-up of subcutaneous mass removal. Symptoms and activity have gradually improved compared to preoperative. The surgical site is clean and has no drainage. Pain is controlled without any narcotic pain medications. He has compliant with postoperative instructions. Past Medical History   has a past medical history of Anxiety, Diabetes mellitus ([address] Major depression, Peripheral nerve disorder, and Peripheral vascular disease, unspecified ([address] Past Surgical History   has a past surgical history that includes Hand surgery (2005); Carpal tunnel release; Coronary angioplasty with stent (2018); and Neck surgery (Right, 8/20/2020).   Medications  has a current medication list which includes the following prescription(s): aspirin low dose, atorvastatin, eucerin, gabapentin, januvia, losartan, metformin, metoprolol tartrate, steglatro, and warfarin. Allergies  has No Known Allergies. Social History   reports that he has quit smoking. He has never used smokeless tobacco. He reports previous alcohol use. He reports that he does not use drugs. Health Screening Exams  Health Maintenance   Topic Date Due    Hepatitis C screen  1965    Lipid screen  09/08/1973    HIV screen  09/08/1978    DTaP/Tdap/Td vaccine (1 - Tdap) 09/08/1982    Diabetes screen  09/08/2003    Shingles Vaccine (1 of 2) 09/08/2013    Colon cancer screen colonoscopy  09/08/2013    Flu vaccine (1) 09/01/2020    Potassium monitoring  05/06/2021    Creatinine monitoring  05/06/2021    Hepatitis A vaccine  Aged Out    Hepatitis B vaccine  Aged Out    Hib vaccine  Aged Out    Meningococcal (ACWY) vaccine  Aged Out    Pneumococcal 0-64 years Vaccine  Aged Out     Review of Systems  History obtained from the patient. Constitutional: Denies any fever or chills. Wound: Denies rash, skin color change or wound problems. OBJECTIVE      VITALS:    Vitals:    09/04/20 1009   BP: 125/67   Pulse: 67   Resp: 15   Temp: 96.3 °F (35.7 °C)   SpO2: 98%          CONSTITUTIONAL: Alert and oriented times 3, no acute distress and cooperative to examination. SKIN: Skin color, texture, turgor normal. No rashes or lesions. INCISION: wound margins intact and healing well. No signs of infection. No drainage. Microscopic Examination:   Sections show a basal cell carcinoma that is free of the resection   margins.  Background chronic sun damage is present. Thank you for the interesting evaluation. Further recommendations as listed above.        Electronically signed by Flo Barragan MD on 9/4/2020 at 9:29 AM

## 2020-09-04 ENCOUNTER — OFFICE VISIT (OUTPATIENT)
Dept: SURGERY | Age: 55
End: 2020-09-04

## 2020-09-04 VITALS
BODY MASS INDEX: 32.02 KG/M2 | WEIGHT: 216.2 LBS | HEIGHT: 69 IN | OXYGEN SATURATION: 98 % | TEMPERATURE: 96.3 F | DIASTOLIC BLOOD PRESSURE: 67 MMHG | RESPIRATION RATE: 15 BRPM | SYSTOLIC BLOOD PRESSURE: 125 MMHG | HEART RATE: 67 BPM

## 2020-09-04 PROBLEM — C44.41 BASAL CELL CARCINOMA (BCC) OF RIGHT SIDE OF NECK: Status: ACTIVE | Noted: 2020-09-04

## 2020-09-04 PROCEDURE — 99024 POSTOP FOLLOW-UP VISIT: CPT | Performed by: SURGERY

## 2020-09-04 NOTE — [place]  [id number] [address] [address]  [phone]     Pt Name: Marciano Hanley  Medical Record Number: [id number]  Date of Birth 1965   Today's Date: 9/4/2020    Hyacinth Manning was evaluated in the office today. My assessment and plans are listed below. ASSESSMENT       Diagnosis Orders   1. Basal cell carcinoma (BCC) of right side of neck        S/p completion excision 8/20/20     Incision is clean, dry and intact or healing as expected   PLANS      Pathology reviewed with the patient who understands. All questions were answered. May return to full activity without restrictions.     Follow up: as needed      If I can provide any additional assistance or you have any concerns, please feel free to contact me. Thank you for allowing to participate in the care of your patients. Sincerely,      Jimmy Joshi.  Matilda Kimball

## 2021-04-02 ENCOUNTER — HOSPITAL ENCOUNTER (OUTPATIENT)
Dept: INTERVENTIONAL RADIOLOGY/VASCULAR | Age: 56
Discharge: HOME | End: 2021-04-02
Payer: MEDICAID

## 2021-04-02 DIAGNOSIS — R42 DIZZINESS AND GIDDINESS: ICD-10-CM

## 2021-04-02 PROCEDURE — 93880 EXTRACRANIAL BILAT STUDY: CPT

## 2021-07-30 ENCOUNTER — HOSPITAL ENCOUNTER (OUTPATIENT)
Age: 56
Setting detail: SPECIMEN
Discharge: HOME | End: 2021-07-30
Payer: MEDICAID

## 2021-07-30 LAB
ABSOLUTE EOS #: 0.48 K/UL (ref 0–0.44)
ABSOLUTE IMMATURE GRANULOCYTE: 0.05 K/UL (ref 0–0.3)
ABSOLUTE LYMPH #: 2.85 K/UL (ref 1.1–3.7)
ABSOLUTE MONO #: 0.93 K/UL (ref 0.1–1.2)
ALBUMIN SERPL-MCNC: 4.2 G/DL (ref 3.5–5.2)
ALBUMIN/GLOBULIN RATIO: 1.7 (ref 1–2.5)
ALP BLD-CCNC: 95 U/L (ref 40–129)
ALT SERPL-CCNC: 16 U/L (ref 5–41)
ANION GAP SERPL CALCULATED.3IONS-SCNC: 13 MMOL/L (ref 9–17)
AST SERPL-CCNC: 12 U/L
BASOPHILS # BLD: 2 % (ref 0–2)
BASOPHILS ABSOLUTE: 0.18 K/UL (ref 0–0.2)
BILIRUB SERPL-MCNC: 0.27 MG/DL (ref 0.3–1.2)
BUN BLDV-MCNC: 19 MG/DL (ref 6–20)
BUN/CREAT BLD: ABNORMAL (ref 9–20)
CALCIUM SERPL-MCNC: 9.5 MG/DL (ref 8.6–10.4)
CHLORIDE BLD-SCNC: 101 MMOL/L (ref 98–107)
CHOLESTEROL/HDL RATIO: 6.6
CHOLESTEROL: 206 MG/DL
CO2: 21 MMOL/L (ref 20–31)
CREAT SERPL-MCNC: 0.99 MG/DL (ref 0.7–1.2)
DIFFERENTIAL TYPE: ABNORMAL
EOSINOPHILS RELATIVE PERCENT: 4 % (ref 1–4)
GFR AFRICAN AMERICAN: >60 ML/MIN
GFR NON-AFRICAN AMERICAN: >60 ML/MIN
GFR SERPL CREATININE-BSD FRML MDRD: ABNORMAL ML/MIN/{1.73_M2}
GFR SERPL CREATININE-BSD FRML MDRD: ABNORMAL ML/MIN/{1.73_M2}
GLUCOSE BLD-MCNC: 290 MG/DL (ref 70–99)
HCT VFR BLD CALC: 46.7 % (ref 40.7–50.3)
HDLC SERPL-MCNC: 31 MG/DL
HEMOGLOBIN: 15.2 G/DL (ref 13–17)
IMMATURE GRANULOCYTES: 0 %
LDL CHOLESTEROL DIRECT: 97 MG/DL
LDL CHOLESTEROL: ABNORMAL MG/DL (ref 0–130)
LYMPHOCYTES # BLD: 24 % (ref 24–43)
MCH RBC QN AUTO: 28.6 PG (ref 25.2–33.5)
MCHC RBC AUTO-ENTMCNC: 32.5 G/DL (ref 28.4–34.8)
MCV RBC AUTO: 87.8 FL (ref 82.6–102.9)
MONOCYTES # BLD: 8 % (ref 3–12)
NRBC AUTOMATED: 0 PER 100 WBC
PDW BLD-RTO: 12.7 % (ref 11.8–14.4)
PLATELET # BLD: 174 K/UL (ref 138–453)
PLATELET ESTIMATE: ABNORMAL
PMV BLD AUTO: 11.7 FL (ref 8.1–13.5)
POTASSIUM SERPL-SCNC: 5 MMOL/L (ref 3.7–5.3)
RBC # BLD: 5.32 M/UL (ref 4.21–5.77)
RBC # BLD: ABNORMAL 10*6/UL
SEG NEUTROPHILS: 62 % (ref 36–65)
SEGMENTED NEUTROPHILS ABSOLUTE COUNT: 7.2 K/UL (ref 1.5–8.1)
SODIUM BLD-SCNC: 135 MMOL/L (ref 135–144)
TOTAL PROTEIN: 6.7 G/DL (ref 6.4–8.3)
TRIGL SERPL-MCNC: 547 MG/DL
VLDLC SERPL CALC-MCNC: ABNORMAL MG/DL (ref 1–30)
WBC # BLD: 11.7 K/UL (ref 3.5–11.3)
WBC # BLD: ABNORMAL 10*3/UL

## 2022-02-25 ENCOUNTER — HOSPITAL ENCOUNTER (OUTPATIENT)
Age: 57
Setting detail: SPECIMEN
Discharge: HOME | End: 2022-02-25

## 2022-02-25 LAB
ALBUMIN SERPL-MCNC: 4.5 G/DL (ref 3.5–5.2)
ALBUMIN/GLOBULIN RATIO: 1.9 (ref 1–2.5)
ALP BLD-CCNC: 91 U/L (ref 40–129)
ALT SERPL-CCNC: 18 U/L (ref 5–41)
ANION GAP SERPL CALCULATED.3IONS-SCNC: 13 MMOL/L (ref 9–17)
AST SERPL-CCNC: 15 U/L
BILIRUB SERPL-MCNC: 0.19 MG/DL (ref 0.3–1.2)
BUN BLDV-MCNC: 19 MG/DL (ref 6–20)
CALCIUM SERPL-MCNC: 10.1 MG/DL (ref 8.6–10.4)
CHLORIDE BLD-SCNC: 103 MMOL/L (ref 98–107)
CHOLESTEROL/HDL RATIO: 7.4
CHOLESTEROL: 280 MG/DL
CO2: 24 MMOL/L (ref 20–31)
CREAT SERPL-MCNC: 0.98 MG/DL (ref 0.7–1.2)
GFR AFRICAN AMERICAN: >60 ML/MIN
GFR NON-AFRICAN AMERICAN: >60 ML/MIN
GFR SERPL CREATININE-BSD FRML MDRD: ABNORMAL ML/MIN/{1.73_M2}
GLUCOSE BLD-MCNC: 192 MG/DL (ref 70–99)
HCT VFR BLD CALC: 48.6 % (ref 40.7–50.3)
HDLC SERPL-MCNC: 38 MG/DL
HEMOGLOBIN: 16 G/DL (ref 13–17)
LDL CHOLESTEROL DIRECT: 166 MG/DL
LDL CHOLESTEROL: ABNORMAL MG/DL (ref 0–130)
MCH RBC QN AUTO: 28.7 PG (ref 25.2–33.5)
MCHC RBC AUTO-ENTMCNC: 32.9 G/DL (ref 28.4–34.8)
MCV RBC AUTO: 87.1 FL (ref 82.6–102.9)
NRBC AUTOMATED: 0 PER 100 WBC
PDW BLD-RTO: 13 % (ref 11.8–14.4)
PLATELET # BLD: 234 K/UL (ref 138–453)
PMV BLD AUTO: 11 FL (ref 8.1–13.5)
POTASSIUM SERPL-SCNC: 5.8 MMOL/L (ref 3.7–5.3)
RBC # BLD: 5.58 M/UL (ref 4.21–5.77)
SODIUM BLD-SCNC: 140 MMOL/L (ref 135–144)
TOTAL PROTEIN: 6.9 G/DL (ref 6.4–8.3)
TRIGL SERPL-MCNC: 454 MG/DL
WBC # BLD: 10.8 K/UL (ref 3.5–11.3)

## 2025-04-17 ENCOUNTER — TRANSCRIBE ORDERS (OUTPATIENT)
Dept: ADMINISTRATIVE | Age: 60
End: 2025-04-17

## 2025-04-17 DIAGNOSIS — K76.0 FATTY METAMORPHOSIS OF LIVER: Primary | ICD-10-CM

## (undated) DEVICE — SUTURE MCRYL SZ 3-0 L27IN ABSRB UD L24MM PS-1 3/8 CIR PRIM Y936H

## (undated) DEVICE — COVER ARMBRD W13XL28.5IN IMPERV BLU FOR OP RM

## (undated) DEVICE — GOWN,SIRUS,NON REINFRCD,LARGE,SET IN SL: Brand: MEDLINE

## (undated) DEVICE — GOWN,SIRUS,NONRNF,SETINSLV,XL,20/CS: Brand: MEDLINE

## (undated) DEVICE — BREAST HERNIA PACK: Brand: MEDLINE INDUSTRIES, INC.

## (undated) DEVICE — LINER SUCT CANSTR 1500CC SEMI RIG W/ POR HYDROPHOBIC SHUT

## (undated) DEVICE — ELECTRODE PT RET AD L9FT HI MOIST COND ADH HYDRGEL CORDED